# Patient Record
Sex: MALE | Race: WHITE | URBAN - METROPOLITAN AREA
[De-identification: names, ages, dates, MRNs, and addresses within clinical notes are randomized per-mention and may not be internally consistent; named-entity substitution may affect disease eponyms.]

---

## 2020-09-09 ENCOUNTER — OFFICE VISIT (OUTPATIENT)
Dept: URBAN - METROPOLITAN AREA CLINIC 92 | Facility: CLINIC | Age: 25
End: 2020-09-09
Payer: COMMERCIAL

## 2020-09-09 VITALS
DIASTOLIC BLOOD PRESSURE: 66 MMHG | TEMPERATURE: 95.5 F | BODY MASS INDEX: 26.52 KG/M2 | HEART RATE: 67 BPM | HEIGHT: 66 IN | WEIGHT: 165 LBS | SYSTOLIC BLOOD PRESSURE: 114 MMHG

## 2020-09-09 DIAGNOSIS — R10.13 DYSPEPSIA: ICD-10-CM

## 2020-09-09 PROCEDURE — 99203 OFFICE O/P NEW LOW 30 MIN: CPT | Performed by: INTERNAL MEDICINE

## 2020-09-09 PROCEDURE — G8427 DOCREV CUR MEDS BY ELIG CLIN: HCPCS | Performed by: INTERNAL MEDICINE

## 2020-09-09 PROCEDURE — 1036F TOBACCO NON-USER: CPT | Performed by: INTERNAL MEDICINE

## 2020-09-09 PROCEDURE — G8417 CALC BMI ABV UP PARAM F/U: HCPCS | Performed by: INTERNAL MEDICINE

## 2020-09-09 NOTE — HPI-TODAY'S VISIT:
Pt seen for complaint of dyspepsia. Symptoms started several years ago. Intermittent. Epigastric discomfort, intermittent n/v, and GERD. On omeprazole daily otc with mild relief. No dysphagia, wt loss or GI bleeding or NSAIDs. Denies marijuana use. No previous EGD/colonoscopy.

## 2020-09-25 ENCOUNTER — OFFICE VISIT (OUTPATIENT)
Dept: URBAN - METROPOLITAN AREA SURGERY CENTER 16 | Facility: SURGERY CENTER | Age: 25
End: 2020-09-25
Payer: COMMERCIAL

## 2020-09-25 ENCOUNTER — CLAIMS CREATED FROM THE CLAIM WINDOW (OUTPATIENT)
Dept: URBAN - METROPOLITAN AREA CLINIC 4 | Facility: CLINIC | Age: 25
End: 2020-09-25
Payer: COMMERCIAL

## 2020-09-25 DIAGNOSIS — K21.0 GASTRO-ESOPHAGEAL REFLUX DISEASE WITH ESOPHAGITIS: ICD-10-CM

## 2020-09-25 DIAGNOSIS — K21.9 ACID REFLUX: ICD-10-CM

## 2020-09-25 DIAGNOSIS — K31.89 OTHER DISEASES OF STOMACH AND DUODENUM: ICD-10-CM

## 2020-09-25 PROCEDURE — G8907 PT DOC NO EVENTS ON DISCHARG: HCPCS | Performed by: INTERNAL MEDICINE

## 2020-09-25 PROCEDURE — 88305 TISSUE EXAM BY PATHOLOGIST: CPT | Performed by: PATHOLOGY

## 2020-09-25 PROCEDURE — 88312 SPECIAL STAINS GROUP 1: CPT | Performed by: PATHOLOGY

## 2020-09-25 PROCEDURE — 43239 EGD BIOPSY SINGLE/MULTIPLE: CPT | Performed by: INTERNAL MEDICINE

## 2020-10-07 ENCOUNTER — DASHBOARD ENCOUNTERS (OUTPATIENT)
Age: 25
End: 2020-10-07

## 2020-10-08 ENCOUNTER — OFFICE VISIT (OUTPATIENT)
Dept: URBAN - METROPOLITAN AREA CLINIC 92 | Facility: CLINIC | Age: 25
End: 2020-10-08

## 2020-10-08 NOTE — HPI-TODAY'S VISIT:
25yoM seen last month for dyspepsia. Symptoms started several years ago. Intermittent. Epigastric discomfort, intermittent n/v, and GERD. On omeprazole daily otc with mild relief. No dysphagia, wt loss or GI bleeding or NSAIDs. Denies marijuana use.  EGD 9/2020 grade A esophagitis and gastritis; bx reflux esophagitis without deutsch's or HP

## 2025-05-02 NOTE — PHYSICAL EXAM CHEST:
no lesions,  no deformities,  no traumatic injuries,  no significant scars are present,  chest wall non-tender,  no masses present, breathing is unlabored without accessory muscle use, normal breath sounds No, psychiatric follow up needed - call with questions...